# Patient Record
Sex: MALE
[De-identification: names, ages, dates, MRNs, and addresses within clinical notes are randomized per-mention and may not be internally consistent; named-entity substitution may affect disease eponyms.]

---

## 2021-06-06 ENCOUNTER — HOSPITAL ENCOUNTER (INPATIENT)
Dept: HOSPITAL 47 - EC | Age: 56
LOS: 3 days | Discharge: HOME HEALTH | DRG: 183 | End: 2021-06-09
Attending: SURGERY | Admitting: SURGERY
Payer: COMMERCIAL

## 2021-06-06 DIAGNOSIS — S27.329A: ICD-10-CM

## 2021-06-06 DIAGNOSIS — J44.9: ICD-10-CM

## 2021-06-06 DIAGNOSIS — I10: ICD-10-CM

## 2021-06-06 DIAGNOSIS — J96.01: ICD-10-CM
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
## 2021-06-06 DIAGNOSIS — S27.0XXA: ICD-10-CM

## 2021-06-06 DIAGNOSIS — Z79.82: ICD-10-CM

## 2021-06-06 DIAGNOSIS — Z87.891: ICD-10-CM

## 2021-06-06 DIAGNOSIS — S22.42XA: Primary | ICD-10-CM

## 2021-06-06 DIAGNOSIS — Z79.899: ICD-10-CM
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
## 2021-06-06 LAB
ANION GAP SERPL CALC-SCNC: 7 MMOL/L
APTT BLD: 18.3 SEC (ref 22–30)
BASOPHILS # BLD AUTO: 0.1 K/UL (ref 0–0.2)
BASOPHILS NFR BLD AUTO: 1 %
BUN SERPL-SCNC: 14 MG/DL (ref 9–20)
CALCIUM SPEC-MCNC: 9.8 MG/DL (ref 8.4–10.2)
CHLORIDE SERPL-SCNC: 104 MMOL/L (ref 98–107)
CO2 SERPL-SCNC: 26 MMOL/L (ref 22–30)
EOSINOPHIL # BLD AUTO: 0.2 K/UL (ref 0–0.7)
EOSINOPHIL NFR BLD AUTO: 2 %
ERYTHROCYTE [DISTWIDTH] IN BLOOD BY AUTOMATED COUNT: 4.48 M/UL (ref 4.3–5.9)
ERYTHROCYTE [DISTWIDTH] IN BLOOD: 14.1 % (ref 11.5–15.5)
GLUCOSE SERPL-MCNC: 132 MG/DL (ref 74–99)
HCT VFR BLD AUTO: 40.7 % (ref 39–53)
HGB BLD-MCNC: 13.8 GM/DL (ref 13–17.5)
INR PPP: 0.9 (ref ?–1.2)
LYMPHOCYTES # SPEC AUTO: 0.8 K/UL (ref 1–4.8)
LYMPHOCYTES NFR SPEC AUTO: 8 %
MCH RBC QN AUTO: 30.8 PG (ref 25–35)
MCHC RBC AUTO-ENTMCNC: 33.8 G/DL (ref 31–37)
MCV RBC AUTO: 90.9 FL (ref 80–100)
MONOCYTES # BLD AUTO: 0.6 K/UL (ref 0–1)
MONOCYTES NFR BLD AUTO: 6 %
NEUTROPHILS # BLD AUTO: 8.1 K/UL (ref 1.3–7.7)
NEUTROPHILS NFR BLD AUTO: 83 %
PLATELET # BLD AUTO: 338 K/UL (ref 150–450)
POTASSIUM SERPL-SCNC: 4.7 MMOL/L (ref 3.5–5.1)
PT BLD: 9.8 SEC (ref 9–12)
SODIUM SERPL-SCNC: 137 MMOL/L (ref 137–145)
WBC # BLD AUTO: 9.8 K/UL (ref 3.8–10.6)

## 2021-06-06 PROCEDURE — 87635 SARS-COV-2 COVID-19 AMP PRB: CPT

## 2021-06-06 PROCEDURE — 80048 BASIC METABOLIC PNL TOTAL CA: CPT

## 2021-06-06 PROCEDURE — 99285 EMERGENCY DEPT VISIT HI MDM: CPT

## 2021-06-06 PROCEDURE — 71250 CT THORAX DX C-: CPT

## 2021-06-06 PROCEDURE — 85730 THROMBOPLASTIN TIME PARTIAL: CPT

## 2021-06-06 PROCEDURE — 94760 N-INVAS EAR/PLS OXIMETRY 1: CPT

## 2021-06-06 PROCEDURE — 85025 COMPLETE CBC W/AUTO DIFF WBC: CPT

## 2021-06-06 PROCEDURE — 71045 X-RAY EXAM CHEST 1 VIEW: CPT

## 2021-06-06 PROCEDURE — 85610 PROTHROMBIN TIME: CPT

## 2021-06-06 RX ADMIN — CEFAZOLIN SCH MLS/HR: 330 INJECTION, POWDER, FOR SOLUTION INTRAMUSCULAR; INTRAVENOUS at 18:14

## 2021-06-06 RX ADMIN — HYDROMORPHONE HYDROCHLORIDE PRN MG: 1 INJECTION, SOLUTION INTRAMUSCULAR; INTRAVENOUS; SUBCUTANEOUS at 22:11

## 2021-06-06 RX ADMIN — HYDROMORPHONE HYDROCHLORIDE PRN MG: 1 INJECTION, SOLUTION INTRAMUSCULAR; INTRAVENOUS; SUBCUTANEOUS at 18:13

## 2021-06-06 NOTE — CT
EXAMINATION TYPE: CT chest wo con

 

DATE OF EXAM: 6/6/2021

 

COMPARISON: None

 

HISTORY: Pain

 

CT DLP: 613 mGycm.  Automated Exposure Control for Dose Reduction was Utilized.

 

TECHNIQUE:  CT scan of the thorax is performed without IV contrast.

 

FINDINGS: There is subcutaneous emphysema along the anterior chest wall on the left. There is a fract
ure of the left first, and second ribs anteriorly, left third rib posteriorly, left fourth rib poster
iorly, left fifth rib laterally. A small focal area of adjacent consolidation likely represents a pul
monary contusion anteriorly. Suspect a tiny less than 5% apical pneumothorax.

 

There is bilateral subsegmental consolidation. Apical paraseptal emphysematous changes are noted. Aor
ta of normal caliber. Mild atherosclerotic change aorta. Heart mildly prominent. Trace pericardial fl
uid. Left thickening of the adrenal gland is nonspecific. Hypertrophic and degenerative changes of th
e spine. Hepatic steatosis is suspected.

 

 

IMPRESSION: 

1. Numerous rib fractures extending from the left first through the left fifth ribs with suspected ad
jacent pulmonary small contusion. A less than 5% left apical pneumothorax suspected.

2. COPD with bilateral infiltrate

3. Left adrenal gland thickening with an 8 mm too small to characterize nodule which is indeterminate
.

## 2021-06-06 NOTE — XR
EXAMINATION TYPE: XR chest 1V portable

 

DATE OF EXAM: 6/6/2021

 

COMPARISON: NONE

 

HISTORY: Rib fractures

 

TECHNIQUE: Single view

 

FINDINGS: There is some mild infiltrate and atelectasis at the lung bases. Heart is enlarged. There i
s no heart failure. There is some pleural thickening on the left lateral chest wall with rib fracture
s. No pneumothorax.

 

IMPRESSION: Pleural thickening and atelectasis not significantly different than the CT scan 5 hours a
go. No pneumothorax.
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
## 2021-06-07 VITALS — RESPIRATION RATE: 18 BRPM

## 2021-06-07 RX ADMIN — CEFAZOLIN SCH MLS/HR: 330 INJECTION, POWDER, FOR SOLUTION INTRAMUSCULAR; INTRAVENOUS at 12:35

## 2021-06-07 RX ADMIN — CEFAZOLIN SCH: 330 INJECTION, POWDER, FOR SOLUTION INTRAMUSCULAR; INTRAVENOUS at 06:04

## 2021-06-07 RX ADMIN — ASPIRIN 81 MG CHEWABLE TABLET SCH MG: 81 TABLET CHEWABLE at 08:57

## 2021-06-07 RX ADMIN — HEPARIN SODIUM SCH UNIT: 5000 INJECTION INTRAVENOUS; SUBCUTANEOUS at 20:17

## 2021-06-07 RX ADMIN — CEFAZOLIN SCH MLS/HR: 330 INJECTION, POWDER, FOR SOLUTION INTRAMUSCULAR; INTRAVENOUS at 20:20

## 2021-06-07 RX ADMIN — PANTOPRAZOLE SODIUM SCH MG: 40 TABLET, DELAYED RELEASE ORAL at 15:48

## 2021-06-07 RX ADMIN — HYDROCODONE BITARTRATE AND ACETAMINOPHEN PRN EACH: 5; 325 TABLET ORAL at 15:48

## 2021-06-07 RX ADMIN — HYDROMORPHONE HYDROCHLORIDE PRN MG: 1 INJECTION, SOLUTION INTRAMUSCULAR; INTRAVENOUS; SUBCUTANEOUS at 08:57

## 2021-06-07 RX ADMIN — HYDROCODONE BITARTRATE AND ACETAMINOPHEN PRN EACH: 5; 325 TABLET ORAL at 20:16

## 2021-06-07 RX ADMIN — HEPARIN SODIUM SCH: 5000 INJECTION INTRAVENOUS; SUBCUTANEOUS at 20:19

## 2021-06-07 RX ADMIN — HYDROMORPHONE HYDROCHLORIDE PRN MG: 1 INJECTION, SOLUTION INTRAMUSCULAR; INTRAVENOUS; SUBCUTANEOUS at 01:37

## 2021-06-07 RX ADMIN — CEFAZOLIN SCH: 330 INJECTION, POWDER, FOR SOLUTION INTRAMUSCULAR; INTRAVENOUS at 18:11

## 2021-06-07 NOTE — P.CNPUL
History of Present Illness


Consult date: 06/07/21


Requesting physician: Javi Sparks


Reason for consult: dyspnea, chest pain, hypoxemia, pneumothorax, abnormal 

CXR/CT


Chief complaint: Chest pain and shortness of breath.


History of present illness: 





Pulmonary consult dated 06/07/2021.





56-year-old male, who presents to the emergency department, on 06/06/2021.  The 

patient came to the ER, because he apparently injured himself at home.  He stephanie

arently injured his left chest area.  He fell while at home, tumbling over his 

walker.  He landed on the left side of his chest, uses left arm to break the 

fall.  He denied any head trauma.  The patient was not on any blood thinners.  

The patient was found to have a pulmonary contusion, a small pneumothorax, and 

multiple rib fractures on the left side, including rib 1, 2, 3, 4 and 5.  When I

went into the room to see the patient, he was on a nonrebreather.  We took the 

nonrebreather off, and we checked his pulse oximetry on room air, and he was 

92%.  He was also nothing by mouth for some unclear reason.  I told the nurses 

that he could be fed, and also to place him on nasal O2 at a couple liters.  We 

also ordered an incentive spirometer for him.  Lab data included a normal CBC, 

normal PT INR and PTT, and normal electrolyte profile.  Testing for coronavirus 

was negative.  A chest x-ray shows some pleural thickening and atelectasis at 

the lung bases.  Computed tomography scan of the chest showed numerous rib 

fractures including rib 1, rib 2, rib 3, rib 4, and rib 5 on the left.  There is

a small pulmonary contusion and a less than 5% left apical pneumothorax as well.







Review of Systems





REVIEW OF SYSTEMS:  


CONSTITUTIONAL:  [Negative.]


NEUROLOGIC: [ Negative.]


HEENT:  [ Negative.]


CARDIAC:  [Negative.]


PULMONARY: Left chest pain, and shortness of breath on deep inspiration.


GI:  [Negative.]


:  [Negative.]


RHEUMATOLOGIC: [ Negative.]


IMMUNOLOGIC: [ Negative.]


ENDOCRINE:  [Negative.  ] 


DERMATOLOGIC: [Negative.]








Past Medical History


Past Medical History: Hypertension


History of Any Multi-Drug Resistant Organisms: None Reported


Past Surgical History: Orthopedic Surgery


Past Anesthesia/Blood Transfusion Reactions: No Reported Reaction


Past Psychological History: No Psychological Hx Reported


Smoking Status: Former smoker


Past Alcohol Use History: Occasional


Past Drug Use History: Marijuana





Medications and Allergies


                                Home Medications











 Medication  Instructions  Recorded  Confirmed  Type


 


Aspirin EC [Ecotrin Low Dose] 81 mg PO DAILY 06/06/21 06/06/21 History


 


amLODIPine [Norvasc] 5 mg PO DAILY 06/06/21 06/06/21 History








                                    Allergies











Allergy/AdvReac Type Severity Reaction Status Date / Time


 


amoxicillin Allergy  Unknown Verified 06/06/21 14:07














Physical Exam


Osteopathic Statement: *.  No significant issues noted on an osteopathic 

structural exam other than those noted in the History and Physical/Consult.


Vitals: 


                                   Vital Signs











  Temp Pulse Pulse Resp BP BP Pulse Ox


 


 06/07/21 08:00  98.2 F   63  18   146/91  96


 


 06/07/21 04:00  97.9 F   104 H  16   125/93  99


 


 06/07/21 02:00    100  16   


 


 06/06/21 23:51  97.9 F   100  16   150/79  99


 


 06/06/21 20:00  98.0 F   103 H  18   160/94  100


 


 06/06/21 19:30   103 H   20  140/94   100


 


 06/06/21 18:15   104 H   20  156/99   100


 


 06/06/21 16:22   111 H   22  144/95   100


 


 06/06/21 14:30   110 H   20  151/98   100


 


 06/06/21 14:27  98.0 F   103 H  18   160/94  100


 


 06/06/21 12:43  99.1 F  110 H   18  152/97   95








                                Intake and Output











 06/06/21 06/07/21 06/07/21





 22:59 06:59 14:59


 


Output Total 625 600 


 


Balance -625 -600 


 


Output:   


 


  Urine 625 600 


 


Other:   


 


  # Voids 2 1 


 


  Weight  119.5 kg 














No acute distress, oriented 3.  Initially on a nonrebreather mask.  3 L 

saturation 96-97%.





HEENT examination is grossly unremarkable.  





Neck supple.  Full range of motion.  No adenopathy thyromegaly or neck vein 

distention.





Cardiovascular examination reveals regular rhythm rate.  S1-S2 normal.  No S3 or

 S4.  No discernible murmur noted.  There is tenderness to palpation in the left

 chest area.





Lungs reveal clear breath sounds.  Breath sounds are equal bilaterally.  No 

adventitious lung sounds including wheezes rhonchi or crackles.





Abdomen soft bowel sounds are heard.  No masses or tenderness.





Extremities are intact.  No cyanosis clubbing or edema.





Skin is without rash or lesion.





Neurologic examination is brief but nonfocal.





Results





- Laboratory Findings


CBC and BMP: 


                                 06/06/21 14:21





                                 06/06/21 14:21


PT/INR, D-dimer











PT  9.8 sec (9.0-12.0)   06/06/21  14:21    


 


INR  0.9  (<1.2)   06/06/21  14:21    








Abnormal lab findings: 


                                  Abnormal Labs











  06/06/21 06/06/21 06/06/21





  14:21 14:21 14:21


 


Neutrophils #  8.1 H  


 


Lymphocytes #  0.8 L  


 


APTT   18.3 L 


 


Glucose    132 H














- Diagnostic Findings


Chest x-ray: image reviewed


CT scan - chest: image reviewed





Assessment and Plan


Assessment: 





Status post fall, with multiple left rib fractures  ( 1 through 5 ), small 

pulmonary contusion, and less than 5% apical pneumothorax on the left.





History of hypertension.








Plan: 





Plan dated 06/07/2021.





We told the patient basically that rib fracture should be managed primarily with

 incentive spirometry, and adequate pain control.  The patient should be 

encouraged to use incentive spirometer every hour while awake.  We also 

recommend deep breathing, coughing, clearing of secretions.  We saw the patient,

 he was on a nonrebreather.  His resting room air saturation was about 92%.  We 

told the nurse that the patient could have a nasal prongs.  Addition, the 

patient was nothing by mouth for some reason and we told the nurses that the pat

ient could eat.  Finally, we did order the incentive spirometer with education. 

 We'll continue to follow.  The patient could likely be discharged home in a day

 or so.


Time with Patient: Greater than 30
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
## 2021-06-08 RX ADMIN — CEFAZOLIN SCH MLS/HR: 330 INJECTION, POWDER, FOR SOLUTION INTRAMUSCULAR; INTRAVENOUS at 04:51

## 2021-06-08 RX ADMIN — PANTOPRAZOLE SODIUM SCH MG: 40 TABLET, DELAYED RELEASE ORAL at 06:27

## 2021-06-08 RX ADMIN — ASPIRIN 81 MG CHEWABLE TABLET SCH MG: 81 TABLET CHEWABLE at 08:17

## 2021-06-08 RX ADMIN — HYDROMORPHONE HYDROCHLORIDE PRN MG: 1 INJECTION, SOLUTION INTRAMUSCULAR; INTRAVENOUS; SUBCUTANEOUS at 18:41

## 2021-06-08 RX ADMIN — HEPARIN SODIUM SCH UNIT: 5000 INJECTION INTRAVENOUS; SUBCUTANEOUS at 19:46

## 2021-06-08 RX ADMIN — HYDROMORPHONE HYDROCHLORIDE PRN MG: 1 INJECTION, SOLUTION INTRAMUSCULAR; INTRAVENOUS; SUBCUTANEOUS at 00:18

## 2021-06-08 RX ADMIN — HYDROMORPHONE HYDROCHLORIDE PRN MG: 1 INJECTION, SOLUTION INTRAMUSCULAR; INTRAVENOUS; SUBCUTANEOUS at 04:50

## 2021-06-08 RX ADMIN — HYDROCODONE BITARTRATE AND ACETAMINOPHEN PRN EACH: 7.5; 325 TABLET ORAL at 13:14

## 2021-06-08 RX ADMIN — HYDROCODONE BITARTRATE AND ACETAMINOPHEN PRN EACH: 5; 325 TABLET ORAL at 08:16

## 2021-06-08 RX ADMIN — HYDROMORPHONE HYDROCHLORIDE PRN MG: 1 INJECTION, SOLUTION INTRAMUSCULAR; INTRAVENOUS; SUBCUTANEOUS at 22:01

## 2021-06-08 RX ADMIN — CEFAZOLIN SCH MLS/HR: 330 INJECTION, POWDER, FOR SOLUTION INTRAMUSCULAR; INTRAVENOUS at 19:18

## 2021-06-08 RX ADMIN — HEPARIN SODIUM SCH UNIT: 5000 INJECTION INTRAVENOUS; SUBCUTANEOUS at 08:17

## 2021-06-08 NOTE — XR
EXAMINATION TYPE: XR tibia fibula LT

 

DATE OF EXAM: 6/8/2021

 

COMPARISON: NONE

 

HISTORY: Pain

 

TECHNIQUE:  Two views are submitted.

 

FINDINGS:

The osseous structures are intact. Postsurgical changes and evidence of remote trauma are noted. Diff
use severe osteopenia involving the distal femur and proximal tibia. Resection portion of the distal 
fibula with displaced fracture fragment noted. Marked deformity of the distal tibia with postsurgical
 change. Cannot exclude osteomyelitis. Diffuse osteopenia involving the visualized osseous structures
 of the foot. Soft tissue edema noted.

 

IMPRESSION:

1. Evidence of remote trauma and postsurgical change with soft tissue edema noted. There is diffuse o
steopenia.

2. There is fragmentation of the distal tibia with areas of lucency which could be postsurgical. Tod
ot exclude infectious etiology.
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
## 2021-06-08 NOTE — P.PN
Subjective


Progress Note Date: 06/08/21


Principal diagnosis: 


 Chest pain, and shortness of breath





56-year-old male, who presents to the emergency department, on 06/06/2021.  The 

patient came to the ER, because he apparently injured himself at home.  He 

apparently injured his left chest area.  He fell while at home, tumbling over 

his walker.  He landed on the left side of his chest, uses left arm to break the

fall.  He denied any head trauma.  The patient was not on any blood thinners.  

The patient was found to have a pulmonary contusion, a small pneumothorax, and 

multiple rib fractures on the left side, including rib 1, 2, 3, 4 and 5.  When I

went into the room to see the patient, he was on a nonrebreather.  We took the 

nonrebreather off, and we checked his pulse oximetry on room air, and he was 

92%.  He was also nothing by mouth for some unclear reason.  I told the nurses 

that he could be fed, and also to place him on nasal O2 at a couple liters.  We 

also ordered an incentive spirometer for him.  Lab data included a normal CBC, 

normal PT INR and PTT, and normal electrolyte profile.  Testing for coronavirus 

was negative.  A chest x-ray shows some pleural thickening and atelectasis at 

the lung bases.  Computed tomography scan of the chest showed numerous rib 

fractures including rib 1, rib 2, rib 3, rib 4, and rib 5 on the left.  There is

a small pulmonary contusion and a less than 5% left apical pneumothorax as well.







On 06/08/2021 patient seen in follow-up on selective care unit.  He is awake and

alert, in no acute distress, he is resting comfortably in bed, currently on 3 L 

of oxygen, off axis 95-97%, no new chest x-ray, no worsening dyspnea, his 

incentive spirometer effort is about 800.  He remains on IV fluids at 139 per 

hour, he is on GI and DVT prophylaxis, he is on Norco and Dilaudid for 

breakthrough pain.  He is tolerating oral diet, no abdominal pain. No new labs 

today.











Objective





- Vital Signs


Vital signs: 


                                   Vital Signs











Temp  98.5 F   06/08/21 08:00


 


Pulse  101 H  06/08/21 08:00


 


Resp  18   06/08/21 08:00


 


BP  146/50   06/08/21 08:00


 


Pulse Ox  95   06/08/21 08:00








                                 Intake & Output











 06/07/21 06/08/21 06/08/21





 18:59 06:59 18:59


 


Intake Total 340 240 


 


Output Total  1000 


 


Balance 340 -760 


 


Weight  123 kg 


 


Intake:   


 


  Oral 340 240 


 


Output:   


 


  Urine  1000 


 


Other:   


 


  Voiding Method  Urinal 


 


  # Voids 2  














- Exam


 GENERAL EXAM: Alert, very pleasant, 56-year-old white male, on 3 L of oxygen 

and the pulse ox of 95-97% comfortable in no apparent distress.


HEAD: Normocephalic/atraumatic.


EYES: Normal reaction of pupils, equal size.  Conjunctiva pink, sclera white.


NOSE: Clear with pink turbinates.


THROAT: No erythema or exudates.


NECK: No masses, no JVD, no thyroid enlargement, no adenopathy.


CHEST: No chest wall deformity.  Symmetrical expansion. 


LUNGS: Equal air entry with no crackles, wheeze, rhonchi or dullness.


CVS: Regular rate and rhythm, normal S1 and S2, no gallops, no murmurs, no rubs


ABDOMEN: Soft, nontender.  No hepatosplenomegaly, normal bowel sounds, no 

guarding or rigidity.


EXTREMITIES: No clubbing, no edema, no cyanosis, 2+ pulses and upper and lower 

extremities.


MUSCULOSKELETAL: Muscle strength and tone normal.


SPINE: No scoliosis or deformity


SKIN: No rashes


CENTRAL NERVOUS SYSTEM: Alert and oriented -3.  No focal deficits, tone is 

normal in all 4 extremities.


PSYCHIATRIC: Alert and oriented -3.  Appropriate affect.  Intact judgment and 

insight.











- Labs


CBC & Chem 7: 


                                 06/06/21 14:21





                                 06/06/21 14:21





Assessment and Plan


Plan: 


 Assessment:





#1.  Acute hypoxic respiratory failure related to small pneumothorax on the left

and pulmonary contusion from a fall and trauma to the left chest





#2.  Multiple left rib fractures (1 through 5)





#3.  History of hypertension





Plan:





Continue encouraging deep breathing and coughing


Vital signs have been stable


Weaning FiO2


From pulmonary perspective he could be considered for discharge home today


Decrease IV fluids to KVO


Obtain home oxygen assessment


Patient may require home oxygen to go home on


Outpatient follow-up with Dr. Castrejon in the office in 7-10 days





I performed a history & physical examination of the patient and discussed their 

management with my nurse practitioner, Nikole Zarate.  I reviewed the nurse 

practitioner's note and agree with the documented findings and plan of care.  

Lung sounds are positive for diminished breath sounds.  The findings and the 

impression was discussed with the patient.  I attest to the documentation by the

nurse practitioner. 





Time with Patient: Less than 30
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
## 2021-06-09 VITALS — TEMPERATURE: 97.9 F | DIASTOLIC BLOOD PRESSURE: 76 MMHG | HEART RATE: 87 BPM | SYSTOLIC BLOOD PRESSURE: 124 MMHG

## 2021-06-09 LAB
BASOPHILS # BLD AUTO: 0 K/UL (ref 0–0.2)
BASOPHILS NFR BLD AUTO: 1 %
EOSINOPHIL # BLD AUTO: 0.3 K/UL (ref 0–0.7)
EOSINOPHIL NFR BLD AUTO: 5 %
ERYTHROCYTE [DISTWIDTH] IN BLOOD BY AUTOMATED COUNT: 4.19 M/UL (ref 4.3–5.9)
ERYTHROCYTE [DISTWIDTH] IN BLOOD: 14.2 % (ref 11.5–15.5)
HCT VFR BLD AUTO: 39.3 % (ref 39–53)
HGB BLD-MCNC: 12.5 GM/DL (ref 13–17.5)
LYMPHOCYTES # SPEC AUTO: 1.3 K/UL (ref 1–4.8)
LYMPHOCYTES NFR SPEC AUTO: 19 %
MCH RBC QN AUTO: 29.8 PG (ref 25–35)
MCHC RBC AUTO-ENTMCNC: 31.8 G/DL (ref 31–37)
MCV RBC AUTO: 93.8 FL (ref 80–100)
MONOCYTES # BLD AUTO: 0.6 K/UL (ref 0–1)
MONOCYTES NFR BLD AUTO: 9 %
NEUTROPHILS # BLD AUTO: 4.5 K/UL (ref 1.3–7.7)
NEUTROPHILS NFR BLD AUTO: 65 %
PLATELET # BLD AUTO: 292 K/UL (ref 150–450)
WBC # BLD AUTO: 6.8 K/UL (ref 3.8–10.6)

## 2021-06-09 RX ADMIN — HYDROMORPHONE HYDROCHLORIDE PRN MG: 1 INJECTION, SOLUTION INTRAMUSCULAR; INTRAVENOUS; SUBCUTANEOUS at 03:39

## 2021-06-09 RX ADMIN — HYDROMORPHONE HYDROCHLORIDE PRN MG: 1 INJECTION, SOLUTION INTRAMUSCULAR; INTRAVENOUS; SUBCUTANEOUS at 07:46

## 2021-06-09 RX ADMIN — ASPIRIN 81 MG CHEWABLE TABLET SCH MG: 81 TABLET CHEWABLE at 07:45

## 2021-06-09 RX ADMIN — CEFAZOLIN SCH: 330 INJECTION, POWDER, FOR SOLUTION INTRAMUSCULAR; INTRAVENOUS at 07:46

## 2021-06-09 RX ADMIN — HYDROCODONE BITARTRATE AND ACETAMINOPHEN PRN EACH: 7.5; 325 TABLET ORAL at 12:37

## 2021-06-09 RX ADMIN — HEPARIN SODIUM SCH UNIT: 5000 INJECTION INTRAVENOUS; SUBCUTANEOUS at 07:45

## 2021-06-09 RX ADMIN — CEFAZOLIN SCH MLS/HR: 330 INJECTION, POWDER, FOR SOLUTION INTRAMUSCULAR; INTRAVENOUS at 06:35

## 2021-06-09 RX ADMIN — PANTOPRAZOLE SODIUM SCH MG: 40 TABLET, DELAYED RELEASE ORAL at 06:35

## 2021-06-09 NOTE — PN
PROGRESS NOTE



DATE OF SERVICE:

06/09/2021



This 56-year-old gentleman who was admitted with fall and left-sided chest pain, had 5%

pneumothorax.  No chest pain.  No palpitations.  No fever.



PHYSICAL EXAMINATION:

Alert and oriented x3.

Pulse 60-87, blood pressure 140/77, respiration 18, temperature 97.8, pulse ox 98% on

room air.

HEENT: Conjunctivae normal.

NECK: No JVD.

CARDIOVASCULAR: S1, S2 muffled.

RESPIRATORY: Breath sounds diminished in the bases.  A few rhonchi.  No crackles.

ABDOMEN:  Soft.

NERVOUS SYSTEM: No focal deficits.



LABS:

WBC 6.8, hemoglobin 12.5.



ASSESSMENT:

1. Fall and left-sided chest pain with multiple rib fractures 1-5 with small apical

    pneumothorax, less than 5%.

2. Chronic obstructive pulmonary disease.  Bilateral infiltrates in the CT scan.

3. Left adrenal gland thickening of the CT scan.

4. Hypertension.

5. Increased random blood glucose.

6. Severe chest pain.



RECOMMENDATIONS AND DISCUSSION:

I recommend to continue current medications, symptomatic treatment.  Incentive

spirometry.  Repeat chest x-ray.  Closely follow with surgery and Pulmonary.  Further

recommendations to follow.



MMODL / IJN: [id number] / Job#: [id number]
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
